# Patient Record
Sex: FEMALE | Race: WHITE | Employment: OTHER | URBAN - METROPOLITAN AREA
[De-identification: names, ages, dates, MRNs, and addresses within clinical notes are randomized per-mention and may not be internally consistent; named-entity substitution may affect disease eponyms.]

---

## 2017-03-02 ENCOUNTER — HOSPITAL ENCOUNTER (OUTPATIENT)
Facility: HOSPITAL | Age: 72
Setting detail: HOSPITAL OUTPATIENT SURGERY
Discharge: HOME | End: 2017-03-02
Attending: INTERNAL MEDICINE | Admitting: INTERNAL MEDICINE
Payer: MEDICARE

## 2017-03-02 ENCOUNTER — SURGERY (OUTPATIENT)
Age: 72
End: 2017-03-02

## 2017-03-02 VITALS
WEIGHT: 170 LBS | HEART RATE: 82 BPM | SYSTOLIC BLOOD PRESSURE: 120 MMHG | BODY MASS INDEX: 25.76 KG/M2 | OXYGEN SATURATION: 97 % | DIASTOLIC BLOOD PRESSURE: 63 MMHG | HEIGHT: 68 IN | RESPIRATION RATE: 16 BRPM | TEMPERATURE: 98 F

## 2017-03-02 DIAGNOSIS — Z12.11 SPECIAL SCREENING FOR MALIGNANT NEOPLASMS, COLON: ICD-10-CM

## 2017-03-02 PROCEDURE — 0DJD8ZZ INSPECTION OF LOWER INTESTINAL TRACT, VIA NATURAL OR ARTIFICIAL OPENING ENDOSCOPIC: ICD-10-PCS | Performed by: INTERNAL MEDICINE

## 2017-03-02 RX ORDER — SODIUM CHLORIDE, SODIUM LACTATE, POTASSIUM CHLORIDE, CALCIUM CHLORIDE 600; 310; 30; 20 MG/100ML; MG/100ML; MG/100ML; MG/100ML
INJECTION, SOLUTION INTRAVENOUS CONTINUOUS
Status: DISCONTINUED | OUTPATIENT
Start: 2017-03-02 | End: 2017-03-02

## 2017-03-02 RX ORDER — MIDAZOLAM HYDROCHLORIDE 1 MG/ML
INJECTION INTRAMUSCULAR; INTRAVENOUS
Status: DISCONTINUED | OUTPATIENT
Start: 2017-03-02 | End: 2017-03-02

## 2017-03-02 NOTE — H&P
[facility] Department of  Gastroenterology  Update Health History :       Carlos A Cirri  female   Anastasia White MD     [id number]  9/26/1945 Primary Care Physician  Benja Judge MD        HPI :  crc Heart Disease Father      coronary disease   • Heart Disease Mother      CHD   • Cancer Mother      skin cancer on some exposed area of face   • Other Mother      osteoporosis   • Diabetes Paternal Grandmother         Smoking Status: Never Smoker completed.       Physical Exam:    /77 mmHg  Pulse 84  Temp(Src) 98.1 °F (36.7 °C) (Oral)  Resp 18  Ht 5' 8\" (1.727 m)  Wt 170 lb (77.111 kg)  BMI 25.85 kg/m2  SpO2 99%  GENERAL: well developed, well nourished, in no apparent distress   SKIN: no rash

## 2017-03-02 NOTE — OPERATIVE REPORT
[facility]    PATIENT'S NAME: Indy La   ATTENDING PHYSICIAN: Rik Blizzard, M.D. OPERATING PHYSICIAN: Rik Blizzard, M.D.    PATIENT ACCOUNT#:   [de-identified]    LOCATION:  ENDO  ENDO POOL ROOMS 1 EDWP 10  MEDICAL RECORD #:   [id number]

## 2017-03-02 NOTE — BRIEF OP NOTE
[facility] ENDOSCOPY  Brief Op Note     Ronald Shaffer Location: OR   [facility] [id number] MRN [id number]   Admission Date 3/2/2017 Operation Date 3/2/2017   Attending Physician Griselda Cano MD Operating Physician Nicky Carter MD       Pre-Operative

## 2017-05-25 PROBLEM — M54.16 LUMBAR RADICULITIS: Status: ACTIVE | Noted: 2017-05-25

## 2017-11-21 PROBLEM — Z28.21 INFLUENZA VACCINATION DECLINED: Status: ACTIVE | Noted: 2017-11-21

## 2017-12-08 PROCEDURE — 82607 VITAMIN B-12: CPT | Performed by: OTHER

## 2017-12-08 PROCEDURE — 83883 ASSAY NEPHELOMETRY NOT SPEC: CPT | Performed by: OTHER

## 2017-12-08 PROCEDURE — 86334 IMMUNOFIX E-PHORESIS SERUM: CPT | Performed by: OTHER

## 2017-12-08 PROCEDURE — 83921 ORGANIC ACID SINGLE QUANT: CPT | Performed by: OTHER

## 2017-12-08 PROCEDURE — 82746 ASSAY OF FOLIC ACID SERUM: CPT | Performed by: OTHER

## 2017-12-08 PROCEDURE — 84165 PROTEIN E-PHORESIS SERUM: CPT | Performed by: OTHER

## 2017-12-22 PROBLEM — I73.9 PVD (PERIPHERAL VASCULAR DISEASE) (HCC): Status: ACTIVE | Noted: 2017-12-22

## 2017-12-28 PROBLEM — R73.03 PREDIABETES: Status: ACTIVE | Noted: 2017-12-28

## 2018-12-26 PROBLEM — I73.9 PVD (PERIPHERAL VASCULAR DISEASE) (HCC): Status: RESOLVED | Noted: 2017-12-22 | Resolved: 2018-12-26

## 2024-09-01 ENCOUNTER — APPOINTMENT (OUTPATIENT)
Dept: GENERAL RADIOLOGY | Age: 79
End: 2024-09-01
Attending: NURSE PRACTITIONER
Payer: MEDICARE

## 2024-09-01 ENCOUNTER — HOSPITAL ENCOUNTER (EMERGENCY)
Age: 79
Discharge: HOME | End: 2024-09-01
Payer: MEDICARE

## 2024-09-01 VITALS
OXYGEN SATURATION: 98 % | HEART RATE: 77 BPM | WEIGHT: 172 LBS | RESPIRATION RATE: 14 BRPM | TEMPERATURE: 98 F | HEIGHT: 67 IN | DIASTOLIC BLOOD PRESSURE: 73 MMHG | SYSTOLIC BLOOD PRESSURE: 151 MMHG | BODY MASS INDEX: 27 KG/M2

## 2024-09-01 DIAGNOSIS — S80.12XA CONTUSION OF LEFT LOWER EXTREMITY, INITIAL ENCOUNTER: ICD-10-CM

## 2024-09-01 DIAGNOSIS — S81.812A NONINFECTED SKIN TEAR OF LEFT LOWER EXTREMITY, INITIAL ENCOUNTER: Primary | ICD-10-CM

## 2024-09-01 PROCEDURE — 73590 X-RAY EXAM OF LOWER LEG: CPT | Performed by: NURSE PRACTITIONER

## 2024-09-01 PROCEDURE — 99283 EMERGENCY DEPT VISIT LOW MDM: CPT
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
## 2024-11-25 ENCOUNTER — HOSPITAL ENCOUNTER (EMERGENCY)
Age: 79
Discharge: HOME | End: 2024-11-25
Attending: EMERGENCY MEDICINE
Payer: MEDICARE

## 2024-11-25 VITALS
BODY MASS INDEX: 27.78 KG/M2 | OXYGEN SATURATION: 99 % | HEIGHT: 67 IN | HEART RATE: 79 BPM | DIASTOLIC BLOOD PRESSURE: 69 MMHG | WEIGHT: 177 LBS | RESPIRATION RATE: 16 BRPM | SYSTOLIC BLOOD PRESSURE: 142 MMHG

## 2024-11-25 DIAGNOSIS — J06.9 VIRAL URI: ICD-10-CM

## 2024-11-25 DIAGNOSIS — J00 ACUTE RHINITIS: Primary | ICD-10-CM

## 2024-11-25 LAB
POCT INFLUENZA A: NEGATIVE
POCT INFLUENZA B: NEGATIVE
SARS-COV-2 RNA RESP QL NAA+PROBE: NOT DETECTED

## 2024-11-25 PROCEDURE — 99283 EMERGENCY DEPT VISIT LOW MDM: CPT

## 2024-11-25 PROCEDURE — 87502 INFLUENZA DNA AMP PROBE: CPT | Performed by: EMERGENCY MEDICINE

## 2024-11-25 RX ORDER — FLUTICASONE PROPIONATE 50 MCG
2 SPRAY, SUSPENSION (ML) NASAL DAILY
Qty: 16 G | Refills: 0 | Status: SHIPPED | OUTPATIENT
Start: 2024-11-25 | End: 2024-12-25

## 2024-11-25 NOTE — ED PROVIDER NOTES
Patient Seen in: [facility] In [place]      History     Chief Complaint   Patient presents with    Cough/URI     Stated Complaint: runny nose that won't go away    Subjective:   HPI      Patient is a pleasant 79-year-old woman presents with a runny nose, congestion over the last 6 days.  Her chief complaint is she cannot get her nose from stopping to run.  Bothers particular in the day.  She been taking Robitussin at night with some relief.  Not short of breath.  Mild cough.  No facial pain.  No fevers.  No other specific complaints.  Patient has been taking Claritin.    Objective:     Past Medical History:    ANEMIA    BACK PAIN    discogenic pain at L5-S1 , previous JOSÉ    Benign familial tremor    benign familial tremor    Benign liver cysts    MRI benign    Cataract    present in both eyes    GERD    Menopause    Osteopenia    Sensitive skin    Sees Dr. Stewart at [facility]    TMJ syndrome    Vitreous detachment    DR NADIRA JORGE              Past Surgical History:   Procedure Laterality Date    Arthroscopy of joint unlisted  9/21/12    Dr. Pal Stanford, right knee w/ partial lateral meniscectomy, chondroplasty of patella    Colonoscopy  7/06    hemorrhoids, recheck in 10 years    Colonoscopy N/A 3/2/2017    Procedure: COLONOSCOPY;  Surgeon: Steven Mercedes MD;  Location:  ENDOSCOPY    D & c      Injection, w/wo contrast, dx/therapeutic substance, epidural/subarachnoid; lumbar/sacral N/A 2/10/2016    Procedure: LUMBAR EPIDURAL;  Surgeon: Robbie Shanks MD;  Location: [facility]    Injection, w/wo contrast, dx/therapeutic substance, epidural/subarachnoid; lumbar/sacral N/A 2/26/2016    Procedure: LUMBAR EPIDURAL;  Surgeon: Robbie Shanks MD;  Location: [facility] PAIN MANAGEMENT    Injection, w/wo contrast, dx/therapeutic substance, epidural/subarachnoid; lumbar/sacral N/A 3/15/2016    Procedure: LUMBAR EPIDURAL;  Surgeon: Robbie Shanks MD;   Location: [facility]    Obstetrical care,vag deliv only      Other Bilateral     stem cell done to bilateral knees     Stab phlebectomy, varicose veins, 1 extremity; <20 incisions  10/10    Dr. Lazar    Tonsillectomy      Upper gi endoscopy,exam  7/06    Normal                Social History     Socioeconomic History    Marital status:     Number of children: 1   Occupational History    Occupation: Retired teacher   Tobacco Use    Smoking status: Never    Smokeless tobacco: Never   Vaping Use    Vaping status: Never Used   Substance and Sexual Activity    Alcohol use: Yes     Alcohol/week: 1.0 standard drink of alcohol     Types: 1 Glasses of wine per week     Comment: social - 0-2 per week    Drug use: No    Sexual activity: Not Currently     Partners: Male   Social History Narrative    . 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 Social alcohol. No illicits. Regular exercise. One son.    Medical power of  , Sanjiv Campos; secondary medical POA is son, Yong Campos.     Social Drivers of Health      Received from [facility]    Housing Stability                  Physical Exam     ED Triage Vitals [11/25/24 1245]   /67   Pulse 86   Resp 16   Temp    Temp src    SpO2 98 %   O2 Device None (Room air)       Current Vitals:   Vital Signs  BP: 133/67  Pulse: 86  Resp: 16    Oxygen Therapy  SpO2: 98 %  O2 Device: None (Room air)        Physical Exam  General: Well-appearing patient of stated age resting comfortably  HEENT: Normocephalic atraumatic.  Nonicteric sclera.  Moist mucous membranes TMs clear  Lungs: No tachypnea clear to auscultation  Cardiac: No tachycardia  Skin: No rashes, pallor  Neuro: No focal deficits.  Extremities: No cyanosis/edema    ED Course     Labs Reviewed   RAPID SARS-COV-2 BY PCR - Normal   POCT FLU TEST - Normal    Narrative:     This assay is a rapid molecular in vitro test utilizing nucleic acid amplification of influenza A and  B viral RNA.            COVID, flu negative       MDM      Patient presents with runny nose, congestion and cough.  Symptoms consistent with a viral URI.  Differential includes COVID, flu pneumonia, other.  Lungs are clear.  Satting 98%.  COVID and flu negative.  Discussed with patient, suggested supportive care.  Can try Afrin 2-3 times a day.  Will prescribe Flonase.  Follow-up with primary care.  Return if worsening symptoms, new complaints.        Medical Decision Making      Disposition and Plan     Clinical Impression:  1. Acute rhinitis    2. Viral URI         Disposition:  Discharge  11/25/2024  1:54 pm    Follow-up:  Richa Cooper MD  [address] [facility]  [address]  [place] [address]  [phone]    Follow up in 1 week(s)            Medications Prescribed:  Current Discharge Medication List        START taking these medications    Details   fluticasone propionate 50 MCG/ACT Nasal Suspension 2 sprays by Nasal route daily.  Qty: 16 g, Refills: 0                 Supplementary Documentation:

## 2024-11-25 NOTE — DISCHARGE INSTRUCTIONS
Can use Afrin.  Do not use longer than 3 days.  Nasal steroids.  Return if worsening symptoms or new complaints

## (undated) DEVICE — Device: Brand: DEFENDO AIR/WATER/SUCTION AND BIOPSY VALVE

## (undated) NOTE — LETTER
Rosalie Steve [address] [facility] [address]  [place], [address] [facility]    Consent for Operation  Date: __________________                                Time: _______________    1.  I authorize the performance upon Christian Morales the following operation:  Procedure(s procedure has been videotaped, the surgeon will obtain the original videotape. The hospital will not be responsible for storage or maintenance of this tape.     6. For the purpose of advancing medical education, I consent to the admittance of observers to t STATEMENTS REQUIRING INSERTION OR COMPLETION WERE FILLED IN.     Signature of Patient:   ___________________________    When the patient is a minor or mentally incompetent to give consent:  Signature of person authorized to consent for patient: ____________

## (undated) NOTE — IP AVS SNAPSHOT
[facility] [place] [address] [facility], [address] ~ [phone]                Discharge Summary   3/2/2017    Misael Barker           Admission Information        Provider Department    3/2/2017 Reina Palacio MD  Endoscopy Take 1 tablet by mouth daily as needed for Allergies.     Anum Davis     [    ]    [    ]    [    ]    [    ]       LUTEIN OR        daily      [    ]    [    ]    [    ]    [    ]       Zahra Grant - 1 tab at noon and 2 tab at [facility] contact your doctor.       **If unable to reach your doctor, please go to the [facility] Emergency Room**    - Your referring physician will receive a full report of your examination.  - If you do not hear from your doctor's office within two weeks of Medicaid plans. To get signed up and covered, please call [phone] and ask to get set up for an insurance coverage that is in-network with [facility].         Visit Information        Department Dept Phone    3/2/2017  5:47 AM Bulmaro Dueñas